# Patient Record
Sex: MALE | Race: BLACK OR AFRICAN AMERICAN | NOT HISPANIC OR LATINO | Employment: FULL TIME | URBAN - METROPOLITAN AREA
[De-identification: names, ages, dates, MRNs, and addresses within clinical notes are randomized per-mention and may not be internally consistent; named-entity substitution may affect disease eponyms.]

---

## 2017-09-26 ENCOUNTER — HOSPITAL ENCOUNTER (EMERGENCY)
Facility: OTHER | Age: 57
Discharge: HOME | End: 2017-09-27
Attending: EMERGENCY MEDICINE
Payer: MEDICAID

## 2017-09-26 DIAGNOSIS — T40.1X1A HEROIN OVERDOSE: ICD-10-CM

## 2017-09-26 PROCEDURE — 99284 EMERGENCY DEPT VISIT MOD MDM: CPT

## 2017-09-27 VITALS
HEIGHT: 69 IN | WEIGHT: 220 LBS | DIASTOLIC BLOOD PRESSURE: 88 MMHG | RESPIRATION RATE: 14 BRPM | SYSTOLIC BLOOD PRESSURE: 168 MMHG | BODY MASS INDEX: 32.58 KG/M2 | HEART RATE: 104 BPM | OXYGEN SATURATION: 94 %

## 2017-09-27 PROCEDURE — 93010 ELECTROCARDIOGRAM REPORT: CPT | Mod: ,,, | Performed by: INTERNAL MEDICINE

## 2017-09-27 NOTE — ED NOTES
EMS states pt was found unresponsive on the floor of a bar w/ pinpoint pupils. Pt was given Narcan, which he appropriately responded to. Pt is A & O x 3, denies drug use adamantly, SOB, fever, chills and N/V/D. No obvious respiratory distress noted. Respirations are even and unlabored. NO nasal flaring and no use of accessory muscles. Pt is able to maintain own airway. Skin is warm and dry w/ pink mucosa. Skin is not cyanotic,clammy or diaphoretic. No redness or flushing to the face. VS. JUANY x 3mm. No JVD. BBS- CTA w/out rales, rhonchi or wheezing. All fields equal upon auscultation. =chest rise observed. Abd- SNT. BS x 4 quadrants. Pt denies dysuria and constipation. PSM x 4 exts. GONZALES w/out difficulty. +2 pulses x 4 exts. No swelling, redness, difference in temperature or deformity to exts x 4. Bed is locked and in the low position w/ the side rails up and locked for safety. Call bell @ BS. Will continue to monitor closely.

## 2017-09-27 NOTE — ED PROVIDER NOTES
"Encounter Date: 9/26/2017    SCRIBE #1 NOTE: I, Freedom Marquez, am scribing for, and in the presence of, Dr. Mcclellan.       History     Chief Complaint   Patient presents with    Drug Overdose     found unresponsive in the club snoring respirations with pinpoint pupils; Narcan 2mg per EMS with improvement; pt moaning but still not able to answer questions     12:00 AM    Patient is a 57 y.o. male who presents to the ED via EMS for a drug overdose. Per EMS, patient was found unresponsive on the club floor with pinpoint pupils and snoring respirations. He was given Narcan 2 mg thirty minutes prior to arrival with improvement. Per EMS, the patient's family report that the patient used heroin. He denies use of heroin and states "I had a beer, that's it." He has no current complaints.        The history is provided by the patient and the EMS personnel.     Review of patient's allergies indicates:  Allergies not on file  No past medical history on file.  No past surgical history on file.  No family history on file.  Social History   Substance Use Topics    Smoking status: Not on file    Smokeless tobacco: Not on file    Alcohol use Not on file     Review of Systems   Constitutional: Negative for fever.   HENT: Negative for sore throat.    Respiratory: Negative for shortness of breath.    Cardiovascular: Negative for chest pain.   Gastrointestinal: Negative for nausea.   Genitourinary: Negative for dysuria.   Musculoskeletal: Negative for back pain.   Skin: Negative for rash.   Neurological: Negative for weakness.   Hematological: Does not bruise/bleed easily.       Physical Exam     Initial Vitals [09/26/17 2352]   BP Pulse Resp Temp SpO2   (!) 142/84 95 18 -- 100 %      MAP       103.33         Physical Exam    Nursing note and vitals reviewed.  Constitutional: He appears well-developed and well-nourished.   Patient smells of vomit.   HENT:   Head: Normocephalic and atraumatic.   Eyes: Conjunctivae and EOM are normal. " Pupils are equal, round, and reactive to light.   Neck: Normal range of motion. Neck supple.   Cardiovascular: Normal rate, regular rhythm and normal heart sounds. Exam reveals no gallop and no friction rub.    No murmur heard.  Pulmonary/Chest: Breath sounds normal. No respiratory distress. He has no wheezes. He has no rhonchi. He has no rales.   Musculoskeletal: Normal range of motion.   Neurological: He is alert and oriented to person, place, and time. He has normal strength. No cranial nerve deficit or sensory deficit.   Skin: Skin is warm and dry.   Psychiatric: His behavior is normal.         ED Course   Procedures  Labs Reviewed   POCT GLUCOSE MONITORING CONTINUOUS     EKG Readings: (Independently Interpreted)   Sinus rhythm. Rate of 96. 1st degree block. No ST-T wave changes.          Medical Decision Making:   Independently Interpreted Test(s):   I have ordered and independently interpreted EKG Reading(s) - see prior notes  Clinical Tests:   Lab Tests: Ordered and Reviewed  Medical Tests: Ordered and Reviewed    Additional MDM:   Comments: 57-year-old male brought in by EMS after being found unresponsive at a club.  According to EMS, the patient's friend/family members admitted that the patient had used heroine.  He did have immediate response to Narcan per EMS.  Throughout his emergency department stay he required no further interventions.  He was observed on a cardiac monitor for over 5 hours without any events.  Blood glucose is within normal limits.  At this time the patient is alert and oriented, asymptomatic, and appropriate for discharge..                 ED Course      Clinical Impression:     1. Heroin overdose                                 Marquita Mcclellan MD  09/27/17 0555

## 2017-09-27 NOTE — ED NOTES
Pt resting w/ eyes closed w/ 02 sat of 82%. Pt arouses easily and 02 sat increases to b/t 96% and 98%. Pt placed on 02 @ 4 LPM via NC.

## 2017-09-27 NOTE — ED NOTES
Rounding on the pt has been done. Pt is A & O x 3, appropriate and resting comfortably. Pt denies respiratory distress, no nasal flaring and respirations are even and unlabored. Skin is warm and dry w/ pink mucosa. NO redness, flushing or diaphoresis observed. Pt denies fever, chills and N/V/D at this time. VS. The pt has been updated on the POC. Pt pain was addressed and is a 0/10. Pt is currently in position of comfort. Pt denies the need for the restroom at this time. Call bell is w/in reach. The pt was advised when a reassessment would take place and pt agreed. Will continue to monitor closely.

## 2017-09-27 NOTE — ED NOTES
Rounding on the pt has been done. Pt resting w/ eyes closed and easily arousable. Pt is A & O x 3, appropriate and resting comfortably. Pt denies respiratory distress, no nasal flaring and respirations are even and unlabored. Skin is warm and dry w/ pink mucosa. NO redness, flushing or diaphoresis observed. Pt denies fever, chills and N/V/D at this time. VS. The pt has been updated on the POC. Pt pain was addressed and is a 0/10. Pt is currently in position of comfort and covered w/ a blanket for warmth. Pt denies the need for the restroom at this time. Call bell is w/in reach. The pt was advised when a reassessment would take place and pt agreed. Will continue to monitor closely.

## 2017-09-27 NOTE — ED NOTES
Pt is A & O x 3, appropriate at this time. Pt denies SOB/respiratory distress. Respirations are even and unlabored. Pt is able to maintain own airway. No acute distress observed. Skin is warm and dry w/ pink mucosa. Pt states pain is a 0/10. Currently awaiting further orders. Pt has been updated on the POC. Bed is locked and in the low position w/ the side rails up and locked for safety. Call bell @ BS and pt has been instructed on its use. Call bell w/in reach of pt and pts current needs addressed. Pt remains on pulse ox, B/P cuff and EKG monitor. Will continue to monitor closely.

## 2017-09-27 NOTE — ED NOTES
Rounding on the pt has been done. Pt is resting w/ eyes closed and easily arousable. Pt is A & O x 3, appropriate and resting comfortably. Pt denies respiratory distress, no nasal flaring and respirations are even and unlabored. Skin is warm and dry w/ pink mucosa. NO redness, flushing or diaphoresis observed. Pt denies fever, chills and N/V/D at this time. VS. The pt has been updated on the POC. Pt pain was addressed and is a 0/10. Pt is currently in position of comfort and covered w/ a blanket for warmth. Pt denies the need for the restroom at this time. Call bell is w/in reach. The pt was advised when a reassessment would take place and pt agreed. Will continue to monitor closely.

## 2017-09-27 NOTE — ED NOTES
Pt resting w/ eyes closed, easily arousable. Pt states he doesn't know why he was he was brought in by EMS. Pt says he had every right to sleep on the floor of the bar. Pt falls asleep when you stop talking to him. Pt is A & O x 3, appropriate at this time. Pt denies SOB/respiratory distress. Respirations are even and unlabored. Pt is able to maintain own airway. No acute distress observed. Skin is warm and dry w/ pink mucosa. Pt states pain is a 0/10. Currently awaiting further orders. Pt has been updated on the POC. Bed is locked and in the low position w/ the side rails up and locked for safety. Call bell @ BS and pt has been instructed on its use. Call bell w/in reach of pt and pts current needs addressed. Pt remains on pulse ox, B/P cuff and EKG monitor. Will continue to monitor closely.

## 2017-10-09 ENCOUNTER — HOSPITAL ENCOUNTER (EMERGENCY)
Facility: OTHER | Age: 57
Discharge: HOME | End: 2017-10-09
Attending: EMERGENCY MEDICINE
Payer: MEDICAID

## 2017-10-09 VITALS
DIASTOLIC BLOOD PRESSURE: 87 MMHG | OXYGEN SATURATION: 96 % | HEIGHT: 69 IN | WEIGHT: 223 LBS | RESPIRATION RATE: 19 BRPM | HEART RATE: 66 BPM | BODY MASS INDEX: 33.03 KG/M2 | TEMPERATURE: 98 F | SYSTOLIC BLOOD PRESSURE: 128 MMHG

## 2017-10-09 DIAGNOSIS — R13.10 DYSPHAGIA, UNSPECIFIED TYPE: Primary | ICD-10-CM

## 2017-10-09 DIAGNOSIS — R13.10 DYSPHAGIA: ICD-10-CM

## 2017-10-09 PROCEDURE — 99283 EMERGENCY DEPT VISIT LOW MDM: CPT | Mod: 25

## 2017-10-09 PROCEDURE — 93010 ELECTROCARDIOGRAM REPORT: CPT | Mod: ,,, | Performed by: INTERNAL MEDICINE

## 2017-10-09 PROCEDURE — 93005 ELECTROCARDIOGRAM TRACING: CPT

## 2017-10-09 NOTE — ED TRIAGE NOTES
Pt reports having difficulty swallowing. Pt reports feeling like food is being stuck in his throat. Pt reports having to hit chest to have food move down throat. Pt reports vomiting after eating without any nausea or feeling poorly prior to eating. Reports some pain at rest. Symptoms present for 3 months.
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
## 2017-10-09 NOTE — ED NOTES
Rounding on the pt performed. Pt updated on plan of care. Pain 0/10. Bathroom needs were addressed. Assisted pt with repositioning. Personal items are within reach. Bed in low, locked position. Side rails up x 2. Call light within reach. Pt denies any concerns/complaints. No s/s of distress.  Monitoring continues

## 2017-10-09 NOTE — ED NOTES
Rounding on the pt performed. Pt updated on plan of care. Pain 0/10. Bathroom needs were addressed. Assisted pt with repositioning. Personal items are within reach. Bed in low, locked position. Side rails up x 2. Call light within reach. Pt denies any concerns/complaints. No s/s of distress.  Wife at bedside. Monitoring continues

## 2018-03-16 ENCOUNTER — HOSPITAL ENCOUNTER (EMERGENCY)
Facility: OTHER | Age: 58
Discharge: HOME | End: 2018-03-16
Attending: EMERGENCY MEDICINE
Payer: MEDICAID

## 2018-03-16 VITALS
HEIGHT: 69 IN | WEIGHT: 220 LBS | DIASTOLIC BLOOD PRESSURE: 78 MMHG | OXYGEN SATURATION: 100 % | SYSTOLIC BLOOD PRESSURE: 124 MMHG | TEMPERATURE: 98 F | BODY MASS INDEX: 32.58 KG/M2 | RESPIRATION RATE: 16 BRPM | HEART RATE: 57 BPM

## 2018-03-16 DIAGNOSIS — K44.9 HIATAL HERNIA: ICD-10-CM

## 2018-03-16 DIAGNOSIS — E27.8 ADRENAL NODULE: ICD-10-CM

## 2018-03-16 DIAGNOSIS — N28.1 RENAL CYST: ICD-10-CM

## 2018-03-16 DIAGNOSIS — K52.9 COLITIS: Primary | ICD-10-CM

## 2018-03-16 LAB
ALBUMIN SERPL BCP-MCNC: 3.5 G/DL
ALP SERPL-CCNC: 81 U/L
ALT SERPL W/O P-5'-P-CCNC: 18 U/L
ANION GAP SERPL CALC-SCNC: 8 MMOL/L
AST SERPL-CCNC: 21 U/L
BACTERIA #/AREA URNS HPF: NORMAL /HPF
BASOPHILS # BLD AUTO: 0.01 K/UL
BASOPHILS NFR BLD: 0.1 %
BILIRUB SERPL-MCNC: 0.7 MG/DL
BILIRUB UR QL STRIP: NEGATIVE
BUN SERPL-MCNC: 15 MG/DL
CALCIUM SERPL-MCNC: 9.5 MG/DL
CHLORIDE SERPL-SCNC: 108 MMOL/L
CLARITY UR: CLEAR
CO2 SERPL-SCNC: 23 MMOL/L
COLOR UR: YELLOW
CREAT SERPL-MCNC: 1.1 MG/DL
DIFFERENTIAL METHOD: NORMAL
EOSINOPHIL # BLD AUTO: 0.2 K/UL
EOSINOPHIL NFR BLD: 3.2 %
ERYTHROCYTE [DISTWIDTH] IN BLOOD BY AUTOMATED COUNT: 13 %
EST. GFR  (AFRICAN AMERICAN): >60 ML/MIN/1.73 M^2
EST. GFR  (NON AFRICAN AMERICAN): >60 ML/MIN/1.73 M^2
GLUCOSE SERPL-MCNC: 97 MG/DL
GLUCOSE UR QL STRIP: NEGATIVE
HCT VFR BLD AUTO: 44.5 %
HGB BLD-MCNC: 15.2 G/DL
HGB UR QL STRIP: ABNORMAL
KETONES UR QL STRIP: NEGATIVE
LEUKOCYTE ESTERASE UR QL STRIP: NEGATIVE
LIPASE SERPL-CCNC: 16 U/L
LYMPHOCYTES # BLD AUTO: 1.4 K/UL
LYMPHOCYTES NFR BLD: 19.1 %
MCH RBC QN AUTO: 30.2 PG
MCHC RBC AUTO-ENTMCNC: 34.2 G/DL
MCV RBC AUTO: 88 FL
MICROSCOPIC COMMENT: NORMAL
MONOCYTES # BLD AUTO: 0.7 K/UL
MONOCYTES NFR BLD: 9.3 %
NEUTROPHILS # BLD AUTO: 4.9 K/UL
NEUTROPHILS NFR BLD: 68 %
NITRITE UR QL STRIP: NEGATIVE
PH UR STRIP: 6 [PH] (ref 5–8)
PLATELET # BLD AUTO: 232 K/UL
PMV BLD AUTO: 10.7 FL
POTASSIUM SERPL-SCNC: 4.3 MMOL/L
PROT SERPL-MCNC: 7.8 G/DL
PROT UR QL STRIP: NEGATIVE
RBC # BLD AUTO: 5.04 M/UL
RBC #/AREA URNS HPF: 2 /HPF (ref 0–4)
SODIUM SERPL-SCNC: 139 MMOL/L
SP GR UR STRIP: 1.02 (ref 1–1.03)
SQUAMOUS #/AREA URNS HPF: 1 /HPF
URN SPEC COLLECT METH UR: ABNORMAL
UROBILINOGEN UR STRIP-ACNC: NEGATIVE EU/DL
WBC # BLD AUTO: 7.19 K/UL
WBC #/AREA URNS HPF: 2 /HPF (ref 0–5)
WBC CLUMPS URNS QL MICRO: NORMAL
YEAST URNS QL MICRO: NORMAL

## 2018-03-16 PROCEDURE — 96361 HYDRATE IV INFUSION ADD-ON: CPT

## 2018-03-16 PROCEDURE — 85025 COMPLETE CBC W/AUTO DIFF WBC: CPT

## 2018-03-16 PROCEDURE — 96374 THER/PROPH/DIAG INJ IV PUSH: CPT

## 2018-03-16 PROCEDURE — 63600175 PHARM REV CODE 636 W HCPCS: Performed by: PHYSICIAN ASSISTANT

## 2018-03-16 PROCEDURE — 80053 COMPREHEN METABOLIC PANEL: CPT

## 2018-03-16 PROCEDURE — 25500020 PHARM REV CODE 255: Performed by: EMERGENCY MEDICINE

## 2018-03-16 PROCEDURE — 25000003 PHARM REV CODE 250: Performed by: PHYSICIAN ASSISTANT

## 2018-03-16 PROCEDURE — 83690 ASSAY OF LIPASE: CPT

## 2018-03-16 PROCEDURE — 99284 EMERGENCY DEPT VISIT MOD MDM: CPT | Mod: 25

## 2018-03-16 PROCEDURE — 81000 URINALYSIS NONAUTO W/SCOPE: CPT

## 2018-03-16 RX ORDER — ONDANSETRON 2 MG/ML
4 INJECTION INTRAMUSCULAR; INTRAVENOUS
Status: COMPLETED | OUTPATIENT
Start: 2018-03-16 | End: 2018-03-16

## 2018-03-16 RX ORDER — ONDANSETRON 4 MG/1
4 TABLET, ORALLY DISINTEGRATING ORAL EVERY 8 HOURS PRN
Qty: 20 TABLET | Refills: 0 | Status: SHIPPED | OUTPATIENT
Start: 2018-03-16

## 2018-03-16 RX ORDER — METRONIDAZOLE 500 MG/1
500 TABLET ORAL 3 TIMES DAILY
Qty: 30 TABLET | Refills: 0 | Status: SHIPPED | OUTPATIENT
Start: 2018-03-16 | End: 2018-03-26

## 2018-03-16 RX ORDER — DICYCLOMINE HYDROCHLORIDE 20 MG/1
20 TABLET ORAL 2 TIMES DAILY PRN
Qty: 20 TABLET | Refills: 0 | Status: SHIPPED | OUTPATIENT
Start: 2018-03-16 | End: 2018-04-15

## 2018-03-16 RX ORDER — CIPROFLOXACIN 500 MG/1
500 TABLET ORAL 2 TIMES DAILY
Qty: 20 TABLET | Refills: 0 | Status: SHIPPED | OUTPATIENT
Start: 2018-03-16 | End: 2018-03-26

## 2018-03-16 RX ORDER — DICYCLOMINE HYDROCHLORIDE 10 MG/1
20 CAPSULE ORAL
Status: COMPLETED | OUTPATIENT
Start: 2018-03-16 | End: 2018-03-16

## 2018-03-16 RX ADMIN — DICYCLOMINE HYDROCHLORIDE 20 MG: 10 CAPSULE ORAL at 10:03

## 2018-03-16 RX ADMIN — IOHEXOL 100 ML: 350 INJECTION, SOLUTION INTRAVENOUS at 11:03

## 2018-03-16 RX ADMIN — SODIUM CHLORIDE 1000 ML: 0.9 INJECTION, SOLUTION INTRAVENOUS at 10:03

## 2018-03-16 RX ADMIN — ONDANSETRON 4 MG: 2 INJECTION INTRAMUSCULAR; INTRAVENOUS at 10:03

## 2018-03-16 NOTE — ED NOTES
Patient Identifiers for Chas Irving checked and correct  LOC: The patient is awake, alert and aware of environment with an appropriate affect, the patient is oriented x 3 and speaking appropriate.  APPEARANCE: Patient resting comfortably and in no acute distress. The patient is clean and well groomed. The patient's clothing is properly fastened.  SKIN: The skin is warm and dry. The patient has normal skin turgor and moist mucus membranes. No rashes or lesions upon observation. Skin Intact , no breakdown noted.  Musculoskeletal :  Normal range of motion noted. Moves all extremities well.  RESPIRATORY: Airway is open and patent, respirations are spontaneous, patient has a normal effort and rate. Breath sounds are clear & equal, bilaterally.  ABDOMEN: Soft and mildly tender to palpation in the bilateral lower quadrant, no distention observed. Bowels Sounds are hyperactive all quads.  PULSES: 2+ radial  pulses, symmetrical.  Will continue to monitor
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
## 2018-03-16 NOTE — ED NOTES
ROUNDING:  Reclining in chair; AAOx4. States abdominal pain 8 /10. States nausea improving. Denies any other discomfort at this time. Skin is warm and dry. Resp:18 even and unlabored. Comfort and BR needs addressed. Plan of care updated. KENNY. Visitor at . Recliner wheels locked and call light within reach. Will continue to monitor

## 2018-03-16 NOTE — ED TRIAGE NOTES
"Pt c/o frequent diarrhea which started yesterday. Pt states "everytime I eat or drink something I have to go run to the bathroom." Pt also c/o bilateral lower quadrant abdominal pain.  "

## 2021-03-01 ENCOUNTER — HOSPITAL ENCOUNTER (EMERGENCY)
Facility: OTHER | Age: 61
Discharge: HOME | End: 2021-03-01
Attending: EMERGENCY MEDICINE
Payer: MEDICAID

## 2021-03-01 VITALS
SYSTOLIC BLOOD PRESSURE: 141 MMHG | DIASTOLIC BLOOD PRESSURE: 84 MMHG | TEMPERATURE: 99 F | OXYGEN SATURATION: 97 % | RESPIRATION RATE: 20 BRPM | WEIGHT: 250 LBS | HEART RATE: 71 BPM | BODY MASS INDEX: 37.03 KG/M2 | HEIGHT: 69 IN

## 2021-03-01 DIAGNOSIS — M25.461 KNEE EFFUSION, RIGHT: ICD-10-CM

## 2021-03-01 DIAGNOSIS — M25.561 ACUTE PAIN OF RIGHT KNEE: Primary | ICD-10-CM

## 2021-03-01 PROCEDURE — 63600175 PHARM REV CODE 636 W HCPCS: Performed by: EMERGENCY MEDICINE

## 2021-03-01 PROCEDURE — 96372 THER/PROPH/DIAG INJ SC/IM: CPT

## 2021-03-01 PROCEDURE — 99284 EMERGENCY DEPT VISIT MOD MDM: CPT | Mod: 25

## 2021-03-01 RX ORDER — KETOROLAC TROMETHAMINE 30 MG/ML
15 INJECTION, SOLUTION INTRAMUSCULAR; INTRAVENOUS
Status: COMPLETED | OUTPATIENT
Start: 2021-03-01 | End: 2021-03-01

## 2021-03-01 RX ADMIN — KETOROLAC TROMETHAMINE 15 MG: 30 INJECTION, SOLUTION INTRAMUSCULAR; INTRAVENOUS at 10:03

## 2021-04-06 ENCOUNTER — HOSPITAL ENCOUNTER (EMERGENCY)
Facility: OTHER | Age: 61
Discharge: HOME | End: 2021-04-06
Attending: EMERGENCY MEDICINE
Payer: MEDICAID

## 2021-04-06 VITALS
DIASTOLIC BLOOD PRESSURE: 86 MMHG | OXYGEN SATURATION: 95 % | BODY MASS INDEX: 37.33 KG/M2 | RESPIRATION RATE: 18 BRPM | HEIGHT: 69 IN | TEMPERATURE: 98 F | SYSTOLIC BLOOD PRESSURE: 115 MMHG | WEIGHT: 252 LBS | HEART RATE: 70 BPM

## 2021-04-06 DIAGNOSIS — G89.29 CHRONIC PAIN OF BOTH KNEES: Primary | ICD-10-CM

## 2021-04-06 DIAGNOSIS — M25.561 CHRONIC PAIN OF BOTH KNEES: Primary | ICD-10-CM

## 2021-04-06 DIAGNOSIS — M25.562 CHRONIC PAIN OF BOTH KNEES: Primary | ICD-10-CM

## 2021-04-06 PROCEDURE — 63600175 PHARM REV CODE 636 W HCPCS: Performed by: EMERGENCY MEDICINE

## 2021-04-06 PROCEDURE — 96372 THER/PROPH/DIAG INJ SC/IM: CPT

## 2021-04-06 PROCEDURE — 99284 EMERGENCY DEPT VISIT MOD MDM: CPT | Mod: 25

## 2021-04-06 RX ORDER — KETOROLAC TROMETHAMINE 30 MG/ML
10 INJECTION, SOLUTION INTRAMUSCULAR; INTRAVENOUS
Status: COMPLETED | OUTPATIENT
Start: 2021-04-06 | End: 2021-04-06

## 2021-04-06 RX ORDER — DICLOFENAC SODIUM 10 MG/G
2 GEL TOPICAL 4 TIMES DAILY
Qty: 150 G | Refills: 0 | Status: SHIPPED | OUTPATIENT
Start: 2021-04-06

## 2021-04-06 RX ORDER — METHYLPREDNISOLONE ACETATE 80 MG/ML
80 INJECTION, SUSPENSION INTRA-ARTICULAR; INTRALESIONAL; INTRAMUSCULAR; SOFT TISSUE
Status: COMPLETED | OUTPATIENT
Start: 2021-04-06 | End: 2021-04-06

## 2021-04-06 RX ORDER — NAPROXEN 500 MG/1
500 TABLET ORAL 2 TIMES DAILY WITH MEALS
Qty: 60 TABLET | Refills: 0 | Status: SHIPPED | OUTPATIENT
Start: 2021-04-06

## 2021-04-06 RX ADMIN — METHYLPREDNISOLONE ACETATE 80 MG: 80 INJECTION, SUSPENSION INTRA-ARTICULAR; INTRALESIONAL; INTRAMUSCULAR; SOFT TISSUE at 06:04

## 2021-04-06 RX ADMIN — KETOROLAC TROMETHAMINE 10 MG: 30 INJECTION, SOLUTION INTRAMUSCULAR; INTRAVENOUS at 06:04

## 2021-04-07 ENCOUNTER — TELEPHONE (OUTPATIENT)
Dept: EMERGENCY MEDICINE | Facility: OTHER | Age: 61
End: 2021-04-07

## 2021-04-13 ENCOUNTER — HOSPITAL ENCOUNTER (OUTPATIENT)
Dept: RADIOLOGY | Facility: HOSPITAL | Age: 61
Discharge: HOME | End: 2021-04-13
Attending: PHYSICIAN ASSISTANT
Payer: COMMERCIAL

## 2021-04-13 ENCOUNTER — OFFICE VISIT (OUTPATIENT)
Dept: ORTHOPEDICS | Facility: CLINIC | Age: 61
End: 2021-04-13
Payer: COMMERCIAL

## 2021-04-13 VITALS
WEIGHT: 249.44 LBS | HEART RATE: 75 BPM | BODY MASS INDEX: 36.94 KG/M2 | HEIGHT: 69 IN | DIASTOLIC BLOOD PRESSURE: 84 MMHG | SYSTOLIC BLOOD PRESSURE: 123 MMHG

## 2021-04-13 DIAGNOSIS — G89.29 CHRONIC PAIN OF BOTH KNEES: ICD-10-CM

## 2021-04-13 DIAGNOSIS — M25.561 CHRONIC PAIN OF BOTH KNEES: ICD-10-CM

## 2021-04-13 DIAGNOSIS — M25.461 EFFUSION OF BURSA OF RIGHT KNEE: ICD-10-CM

## 2021-04-13 DIAGNOSIS — M25.562 CHRONIC PAIN OF BOTH KNEES: ICD-10-CM

## 2021-04-13 DIAGNOSIS — M17.11 PRIMARY OSTEOARTHRITIS OF RIGHT KNEE: Primary | ICD-10-CM

## 2021-04-13 PROCEDURE — 20610 DRAIN/INJ JOINT/BURSA W/O US: CPT | Mod: RT,S$GLB,, | Performed by: PHYSICIAN ASSISTANT

## 2021-04-13 PROCEDURE — 99214 OFFICE O/P EST MOD 30 MIN: CPT | Mod: PBBFAC,25 | Performed by: PHYSICIAN ASSISTANT

## 2021-04-13 PROCEDURE — 73565 X-RAY EXAM OF KNEES: CPT | Mod: TC

## 2021-04-13 PROCEDURE — 99203 OFFICE O/P NEW LOW 30 MIN: CPT | Mod: 25,S$GLB,, | Performed by: PHYSICIAN ASSISTANT

## 2021-04-13 PROCEDURE — 73565 XR KNEE AP STANDING BILATERAL: ICD-10-PCS | Mod: 26,,, | Performed by: RADIOLOGY

## 2021-04-13 PROCEDURE — 99999 PR PBB SHADOW E&M-EST. PATIENT-LVL IV: ICD-10-PCS | Mod: PBBFAC,,, | Performed by: PHYSICIAN ASSISTANT

## 2021-04-13 PROCEDURE — 99999 PR PBB SHADOW E&M-EST. PATIENT-LVL IV: CPT | Mod: PBBFAC,,, | Performed by: PHYSICIAN ASSISTANT

## 2021-04-13 PROCEDURE — 73565 X-RAY EXAM OF KNEES: CPT | Mod: 26,,, | Performed by: RADIOLOGY

## 2021-04-13 PROCEDURE — 20610 PR DRAIN/INJECT LARGE JOINT/BURSA: ICD-10-PCS | Mod: RT,S$GLB,, | Performed by: PHYSICIAN ASSISTANT

## 2021-04-13 PROCEDURE — 20610 DRAIN/INJ JOINT/BURSA W/O US: CPT | Mod: PBBFAC | Performed by: PHYSICIAN ASSISTANT

## 2021-04-13 PROCEDURE — 99203 PR OFFICE/OUTPT VISIT, NEW, LEVL III, 30-44 MIN: ICD-10-PCS | Mod: 25,S$GLB,, | Performed by: PHYSICIAN ASSISTANT

## 2021-04-13 RX ORDER — BETAMETHASONE SODIUM PHOSPHATE AND BETAMETHASONE ACETATE 3; 3 MG/ML; MG/ML
6 INJECTION, SUSPENSION INTRA-ARTICULAR; INTRALESIONAL; INTRAMUSCULAR; SOFT TISSUE
Status: COMPLETED | OUTPATIENT
Start: 2021-04-13 | End: 2021-04-13

## 2021-04-13 RX ADMIN — BETAMETHASONE SODIUM PHOSPHATE AND BETAMETHASONE ACETATE 6 MG: 3; 3 INJECTION, SUSPENSION INTRA-ARTICULAR; INTRALESIONAL; INTRAMUSCULAR at 02:04

## 2022-08-30 ENCOUNTER — HOSPITAL ENCOUNTER (EMERGENCY)
Facility: OTHER | Age: 62
Discharge: HOME | End: 2022-08-30
Attending: EMERGENCY MEDICINE
Payer: COMMERCIAL

## 2022-08-30 VITALS
HEIGHT: 69 IN | TEMPERATURE: 98 F | SYSTOLIC BLOOD PRESSURE: 112 MMHG | DIASTOLIC BLOOD PRESSURE: 75 MMHG | WEIGHT: 250 LBS | RESPIRATION RATE: 18 BRPM | OXYGEN SATURATION: 98 % | BODY MASS INDEX: 37.03 KG/M2 | HEART RATE: 75 BPM

## 2022-08-30 DIAGNOSIS — M17.12 OSTEOARTHRITIS OF LEFT KNEE, UNSPECIFIED OSTEOARTHRITIS TYPE: ICD-10-CM

## 2022-08-30 DIAGNOSIS — I70.90 ATHEROSCLEROSIS: ICD-10-CM

## 2022-08-30 DIAGNOSIS — M25.569 KNEE PAIN: Primary | ICD-10-CM

## 2022-08-30 PROCEDURE — 25000003 PHARM REV CODE 250: Performed by: PHYSICIAN ASSISTANT

## 2022-08-30 PROCEDURE — 99283 EMERGENCY DEPT VISIT LOW MDM: CPT | Mod: 25

## 2022-08-30 RX ORDER — IBUPROFEN 600 MG/1
600 TABLET ORAL
Status: COMPLETED | OUTPATIENT
Start: 2022-08-30 | End: 2022-08-30

## 2022-08-30 RX ADMIN — IBUPROFEN 600 MG: 600 TABLET ORAL at 05:08

## 2022-08-30 NOTE — ED TRIAGE NOTES
Pt arrived to ED with c/o L knee pain that started two months ago and got worse today. Pt denies any recent falls. Pt AAox4, NAD noted.

## 2022-08-30 NOTE — ED PROVIDER NOTES
"Encounter Date: 8/30/2022       History     Chief Complaint   Patient presents with    Knee Pain     L knee pain x 2 months "thumping like a toothache"; denies trauma; slight relief with otc Aleve     Patient is a 62-year-old male who presents to the emergency department with left knee pain.  Patient reports over the last several months he has had pain to the left knee.  Denies known injury.  Reports most of the pain is on the medial aspect.  He states sometimes the pain is worse than others.  He states the pain is worse with movement.  He denies obvious swelling, redness, or warmth.  He denies fevers.  He reports he does have chronic back pain that he takes Percocet for daily.    The history is provided by the patient.   Review of patient's allergies indicates:  No Known Allergies  No past medical history on file.  No past surgical history on file.  No family history on file.  Social History     Tobacco Use    Smoking status: Every Day     Packs/day: 1.00     Types: Cigarettes    Smokeless tobacco: Never   Substance Use Topics    Alcohol use: Yes     Comment: occassional    Drug use: Yes     Comment: Heroin - snorted     Review of Systems   Constitutional:  Negative for activity change, appetite change, chills, fatigue and fever.   HENT:  Negative for congestion, ear discharge, ear pain, postnasal drip, rhinorrhea and sore throat.    Respiratory:  Negative for cough and shortness of breath.    Cardiovascular:  Negative for chest pain.   Gastrointestinal:  Negative for abdominal pain, blood in stool, constipation, diarrhea, nausea and vomiting.   Genitourinary:  Negative for dysuria, flank pain and hematuria.   Musculoskeletal:  Negative for back pain, neck pain and neck stiffness.        Left knee pain   Skin:  Negative for rash and wound.   Neurological:  Negative for dizziness, light-headedness and headaches.     Physical Exam     Initial Vitals   BP Pulse Resp Temp SpO2   08/30/22 1408 08/30/22 1408 08/30/22 " 1725 08/30/22 1408 08/30/22 1408   125/81 83 18 98.4 °F (36.9 °C) (!) 93 %      MAP       --                Physical Exam    Nursing note and vitals reviewed.  Constitutional: He appears well-developed and well-nourished. He is not diaphoretic. No distress.   HENT:   Head: Normocephalic.   Right Ear: External ear normal.   Left Ear: External ear normal.   Eyes: Conjunctivae are normal. Pupils are equal, round, and reactive to light.   Neck:   Normal range of motion.  Cardiovascular:  Normal rate and regular rhythm.           Pulmonary/Chest: Breath sounds normal.   Abdominal: Abdomen is soft. Bowel sounds are normal. There is no abdominal tenderness.   Musculoskeletal:      Cervical back: Normal range of motion.      Left knee: Bony tenderness present. No swelling, effusion, erythema or ecchymosis. Decreased range of motion.     Neurological: He is alert and oriented to person, place, and time.   Skin: Skin is warm and dry. Capillary refill takes less than 2 seconds.   Psychiatric: He has a normal mood and affect.       ED Course   Procedures  Labs Reviewed   HIV 1 / 2 ANTIBODY   HEPATITIS C ANTIBODY          Imaging Results              X-Ray Knee 3 View Left (Final result)  Result time 08/30/22 16:56:32      Final result by Prem Hernadez MD (08/30/22 16:56:32)                   Impression:      1. No convincing acute displaced fracture or dislocation of the knee allowing for prominent degenerative changes.      Electronically signed by: Prem Hernadez MD  Date:    08/30/2022  Time:    16:56               Narrative:    EXAMINATION:  XR KNEE 3 VIEW LEFT    CLINICAL HISTORY:  Pain in unspecified knee    TECHNIQUE:  AP, lateral, and Merchant views of the left knee were performed.    COMPARISON:  04/13/2021    FINDINGS:  Three views left knee.    Degenerative changes are noted of the knee particularly involving the medial compartment.  There is prominent osteophytosis involving the lateral margin of the medial  tibial plateau, similar in configuration as compared to the previous exam.  No large knee joint effusion.  No acute displaced fracture or dislocation of the knee.  There is vascular calcification.                                       Medications - No data to display  Medical Decision Making:   Initial Assessment:   Urgent evaluation of a 62-year-old male who presents to the emergency department with left knee pain.  Patient is afebrile, nontoxic appearing, and hemodynamically stable.  No history of known injury.  No swelling, deformity, redness, warmth.  No evidence for gout or septic joint.  X-rays obtained revealing no acute osseous injury.  There are degenerative changes noted.  Incidental finding of atherosclerosis.  Patient will be advised follow-up with ortho.  Patient is advised to follow up with PCP.  Patient is advised to return to the emergency department with any worsening symptoms or concerns.                    Clinical Impression:   Final diagnoses:  [M25.569] Knee pain (Primary)        ED Disposition Condition    Discharge Stable          ED Prescriptions    None       Follow-up Information    None          Candice Gill PA-C  08/30/22 1524

## 2023-10-26 ENCOUNTER — HOSPITAL ENCOUNTER (EMERGENCY)
Facility: OTHER | Age: 63
Discharge: HOME | End: 2023-10-26
Attending: EMERGENCY MEDICINE
Payer: COMMERCIAL

## 2023-10-26 VITALS
TEMPERATURE: 98 F | SYSTOLIC BLOOD PRESSURE: 118 MMHG | HEART RATE: 80 BPM | RESPIRATION RATE: 18 BRPM | WEIGHT: 248 LBS | OXYGEN SATURATION: 96 % | DIASTOLIC BLOOD PRESSURE: 72 MMHG | HEIGHT: 69 IN | BODY MASS INDEX: 36.73 KG/M2

## 2023-10-26 DIAGNOSIS — M25.562 LEFT KNEE PAIN: ICD-10-CM

## 2023-10-26 PROCEDURE — 99284 EMERGENCY DEPT VISIT MOD MDM: CPT

## 2023-10-26 PROCEDURE — 96372 THER/PROPH/DIAG INJ SC/IM: CPT

## 2023-10-26 PROCEDURE — 63600175 PHARM REV CODE 636 W HCPCS

## 2023-10-26 RX ORDER — DICLOFENAC SODIUM 10 MG/G
2 GEL TOPICAL 4 TIMES DAILY
Qty: 50 G | Refills: 0 | Status: SHIPPED | OUTPATIENT
Start: 2023-10-26

## 2023-10-26 RX ORDER — MELOXICAM 15 MG/1
15 TABLET ORAL DAILY
Qty: 15 TABLET | Refills: 0 | Status: SHIPPED | OUTPATIENT
Start: 2023-10-26 | End: 2023-11-10

## 2023-10-26 RX ORDER — KETOROLAC TROMETHAMINE 30 MG/ML
30 INJECTION, SOLUTION INTRAMUSCULAR; INTRAVENOUS
Status: COMPLETED | OUTPATIENT
Start: 2023-10-26 | End: 2023-10-26

## 2023-10-26 RX ADMIN — KETOROLAC TROMETHAMINE 30 MG: 30 INJECTION, SOLUTION INTRAMUSCULAR; INTRAVENOUS at 03:10

## 2023-10-26 NOTE — Clinical Note
"Chas Irving (Andre) was seen and treated in our emergency department on 10/26/2023.  He may return to work on 10/27/2023.       If you have any questions or concerns, please don't hesitate to call.       RN    "

## 2023-10-26 NOTE — ED PROVIDER NOTES
Encounter Date: 10/26/2023       History     Chief Complaint   Patient presents with    Knee Pain     Knee pain and swelling x 2 days. Denies known injury.      This is a 63-year-old male who presents to the ED with left knee pain x2 days.  No known fall, trauma, or mechanism of injury.  Has had chronic knee pain and issues with both knees in the past.  Has not taken anything for alleviation of symptoms.  States that standing for a long time make the symptoms worse.  Has an appointment with his PCP scheduled for next week.  Denies fever, chills, numbness or tingling, shortness of breath.    The history is provided by the patient.     Review of patient's allergies indicates:  No Known Allergies  Past Medical History:   Diagnosis Date    Hypertension      History reviewed. No pertinent surgical history.  History reviewed. No pertinent family history.  Social History     Tobacco Use    Smoking status: Every Day     Current packs/day: 1.00     Types: Cigarettes    Smokeless tobacco: Never   Substance Use Topics    Alcohol use: Yes     Comment: occassional    Drug use: Yes     Comment: Heroin - snorted     Review of Systems  As per HPI above  Physical Exam     Initial Vitals [10/26/23 1311]   BP Pulse Resp Temp SpO2   118/72 80 18 98.1 °F (36.7 °C) 96 %      MAP       --         Physical Exam    Constitutional: Vital signs are normal. He appears well-developed and well-nourished. He is cooperative.  Non-toxic appearance. He does not appear ill. No distress.   HENT:   Head: Normocephalic and atraumatic.   Eyes: Conjunctivae and lids are normal.   Neck: Trachea normal. Neck supple. No stridor present.   Cardiovascular:  Normal rate and regular rhythm.           Pulmonary/Chest: Effort normal. No tachypnea. No respiratory distress.   Abdominal: Abdomen is soft.   Musculoskeletal:      Cervical back: Neck supple.      Comments: No patellar laxity, or effusion.  Mild swelling noted to medial aspect of left knee with out  significant tenderness to palpation.  No overlying erythema or ecchymosis.  Full active and passive range of motion without pain.  Popliteal pulses 2+.  Strength 5/5.  Sensation intact.     Neurological: He is alert and oriented to person, place, and time. GCS eye subscore is 4. GCS verbal subscore is 5. GCS motor subscore is 6.   Skin: Skin is warm, dry and intact. No rash noted.   Psychiatric: He has a normal mood and affect. His speech is normal and behavior is normal. Thought content normal.         ED Course   Procedures  Labs Reviewed - No data to display       Imaging Results              X-Ray Knee 1 or 2 View Left (Final result)  Result time 10/26/23 15:28:28   Procedure changed from X-Ray Knee 3 View Left     Final result by Domingo Bravo MD (10/26/23 15:28:28)                   Impression:      No evidence for acute fracture, bone destruction, or dislocation.    Prominent degenerative changes with moderate narrowing of the medial femorotibial joint space.    Calcifications posterior to the femorotibial joint suggestive of possible calcified loose bodies.      Electronically signed by: Domingo Bravo MD  Date:    10/26/2023  Time:    15:28               Narrative:    EXAMINATION:  XR KNEE 1 OR 2 VIEW LEFT    CLINICAL HISTORY:  knee pain; Pain in left knee    TECHNIQUE:  One or two views of the left knee were performed.    COMPARISON:  08/30/2022.    FINDINGS:  The bones appear intact.  There is no evidence for acute fracture or bone destruction.  There are prominent degenerative changes of the femorotibial and patellofemoral joints with prominent osteophytes.  There is moderate narrowing of the medial compartment of the femorotibial joint with subchondral sclerosis.  There is no plain film evidence for joint effusion.  There are calcifications posterior to the femorotibial joint which may be related to calcified loose bodies.                                       Medications   ketorolac injection 30 mg  (30 mg Intramuscular Given 10/26/23 1521)     Medical Decision Making  Urgent evaluation of an afebrile 63-year-old male with atraumatic left knee pain. Known history of chronic knee pain. No erythema or warmth to suggest gout. Likely acute on chronic pain secondary to osteoarthritis, but will obtain imaging to r/o effusion as chart review shows prior large effusion with joint aspiration performed in the ED. No fever, redness, or limited range of motion to suggest hemarthrosis, septic joint, cellulitis, or reactive arthritis.  Will give anti-inflammatories and ice.    Differential diagnosis includes but is not limited to:  Osteoarthritis, muscle strain, ligament tear/sprain, bursitis, soft tissue contusion    Problems Addressed:  Left knee pain:     Details: Patient with improvement in symptoms after administration of Toradol. X-ray shows no acute bony abnormality.  Patient has follow-up with his primary care physician scheduled.  Will discharge with short course of anti-inflammatories and educated on RICE protocol.  Patient given return precautions and educated on worrisome symptoms for which they should return to the ER, including worsening swelling or pain, redness, fever.  He reported understanding and all questions were answered.    Amount and/or Complexity of Data Reviewed  Radiology: ordered.     Details: No evidence for acute fracture, bone destruction, or dislocation.  Prominent degenerative changes with moderate narrowing of the medial femorotibial joint space. Calcifications posterior to the femorotibial joint suggestive of possible calcified loose bodies.                                Clinical Impression:   Final diagnoses:  [M25.562] Left knee pain        ED Disposition Condition    Discharge Stable          ED Prescriptions       Medication Sig Dispense Start Date End Date Auth. Provider    meloxicam (MOBIC) 15 MG tablet Take 1 tablet (15 mg total) by mouth once daily. for 15 days 15 tablet 10/26/2023  11/10/2023 Zulay Gonzalez PA-C    diclofenac sodium (VOLTAREN) 1 % Gel Apply 2 g topically 4 (four) times daily. 50 g 10/26/2023 -- Zulay Gonzalez PA-C          Follow-up Information    None          Zulay Gonzalez PA-C  10/26/23 1537

## 2023-10-26 NOTE — FIRST PROVIDER EVALUATION
Emergency Department TeleTriage Encounter Note      CHIEF COMPLAINT    Chief Complaint   Patient presents with    Knee Pain     Knee pain and swelling x 2 days. Denies known injury.        VITAL SIGNS   Initial Vitals [10/26/23 1311]   BP Pulse Resp Temp SpO2   118/72 80 18 98.1 °F (36.7 °C) 96 %      MAP       --            ALLERGIES    Review of patient's allergies indicates:  No Known Allergies    PROVIDER TRIAGE NOTE  Verbal consent for the teletriage evaluation was given by the patient at the start of the evaluation.  All efforts will be made to maintain patient's privacy during the evaluation.      This is a teletriage evaluation of a 63 y.o. male presenting to the ED with c/o left knee pain and swelling for 4 days. No trauma or injury.  Limited physical exam via telehealth: The patient is awake, alert, answering questions appropriately and is not in respiratory distress.  As the Teletriage provider, I performed an initial assessment and ordered appropriate labs and imaging studies, if any, to facilitate the patient's care once placed in the ED. Once a room is available, care and a full evaluation will be completed by an alternate ED provider.  Any additional orders and the final disposition will be determined by that provider.  All imaging and labs will not be followed-up by the Teletriage Team, including myself.          ORDERS  Labs Reviewed - No data to display    ED Orders (720h ago, onward)      Start Ordered     Status Ordering Provider    10/26/23 1318 10/26/23 1317  X-Ray Knee 3 View Left  1 time imaging         Ordered MEGHNA JENSEN A              Virtual Visit Note: The provider triage portion of this emergency department evaluation and documentation was performed via [employer], a HIPAA-compliant telemedicine application, in concert with a tele-presenter in the room. A face to face patient evaluation with one of my colleagues will occur once the patient is placed in an emergency department  room.      DISCLAIMER: This note was prepared with [employer] voice recognition transcription software. Garbled syntax, mangled pronouns, and other bizarre constructions may be attributed to that software system.

## 2023-10-26 NOTE — ED TRIAGE NOTES
Pt presents to the ER with complaints of left knee pain and swelling for the past couple of days; states he believes  he may have twisted it. Pt states pain is worse with weight-bearing.

## 2023-10-26 NOTE — DISCHARGE INSTRUCTIONS
Start taking meloxicam daily for swelling and you may also use topical Voltaren gel for additional pain and swelling relief.  Please see the attached instructions for RICE protocol to help with pain and swelling.  Follow-up with your primary care physician.

## 2023-10-26 NOTE — ED NOTES
LOC: The patient is awake, alert, and oriented to self, place, time, and situation. Pt is calm and cooperative. Affect is appropriate.  Speech is appropriate and clear.     APPEARANCE: Patient sitting in chair in no acute distress.  Patient is clean and well groomed.    SKIN: The skin is warm and dry; color consistent with ethnicity.  Patient has normal skin turgor and moist mucus membranes.  Skin intact; no breakdown or bruising noted.     MUSCULOSKELETAL: Patient moving bilateral upper and lower extremities without difficulty with exception of the  left  knee; limited mobility due to pain. Pt denies pain in the back.  Denies weakness.     RESPIRATORY: Airway is open and patent. Respirations spontaneous, even, easy, and non-labored.  Patient has a normal effort and rate.  No accessory muscle use noted. Denies cough.     CARDIAC:  Normal rate noted.  No peripheral edema noted. No complaints of chest pain.     ABDOMEN: Soft and non tender to palpation.  No distention noted. Pt denies abdominal pain; denies nausea, vomiting, diarrhea, or constipation.    NEUROLOGIC: Eyes open spontaneously.  Behavior appropriate to situation.  Follows commands; facial expression symmetrical.  Purposeful motor response noted; normal sensation in all extremities. Pt denies headache; denies lightheadedness or dizziness; denies visual disturbances; denies loss of balance; denies unilateral weakness.

## 2024-07-23 ENCOUNTER — HOSPITAL ENCOUNTER (EMERGENCY)
Facility: OTHER | Age: 64
Discharge: HOME | End: 2024-07-23
Attending: EMERGENCY MEDICINE
Payer: MEDICAID

## 2024-07-23 VITALS
BODY MASS INDEX: 37.18 KG/M2 | WEIGHT: 251 LBS | HEART RATE: 61 BPM | HEIGHT: 69 IN | TEMPERATURE: 98 F | SYSTOLIC BLOOD PRESSURE: 103 MMHG | RESPIRATION RATE: 20 BRPM | OXYGEN SATURATION: 99 % | DIASTOLIC BLOOD PRESSURE: 68 MMHG

## 2024-07-23 DIAGNOSIS — M17.0 OSTEOARTHRITIS OF BOTH KNEES, UNSPECIFIED OSTEOARTHRITIS TYPE: Primary | ICD-10-CM

## 2024-07-23 DIAGNOSIS — M25.562 BILATERAL KNEE PAIN: ICD-10-CM

## 2024-07-23 DIAGNOSIS — M25.561 BILATERAL KNEE PAIN: ICD-10-CM

## 2024-07-23 PROCEDURE — 99284 EMERGENCY DEPT VISIT MOD MDM: CPT | Mod: 25
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
## 2024-09-09 DIAGNOSIS — M54.50 LOW BACK PAIN, UNSPECIFIED: Primary | ICD-10-CM

## 2024-10-30 DIAGNOSIS — M54.50 LOW BACK PAIN, UNSPECIFIED: Primary | ICD-10-CM

## 2024-10-31 ENCOUNTER — HOSPITAL ENCOUNTER (OUTPATIENT)
Dept: RADIOLOGY | Facility: OTHER | Age: 64
Discharge: HOME | End: 2024-10-31
Attending: PHYSICAL MEDICINE & REHABILITATION
Payer: COMMERCIAL

## 2024-10-31 DIAGNOSIS — M54.50 LOW BACK PAIN, UNSPECIFIED: ICD-10-CM

## 2024-10-31 PROCEDURE — 72131 CT LUMBAR SPINE W/O DYE: CPT | Mod: 26,,, | Performed by: RADIOLOGY

## 2024-10-31 PROCEDURE — 72131 CT LUMBAR SPINE W/O DYE: CPT | Mod: TC
